# Patient Record
Sex: MALE | Race: WHITE | NOT HISPANIC OR LATINO | Employment: PART TIME | URBAN - METROPOLITAN AREA
[De-identification: names, ages, dates, MRNs, and addresses within clinical notes are randomized per-mention and may not be internally consistent; named-entity substitution may affect disease eponyms.]

---

## 2017-12-19 ENCOUNTER — ALLSCRIPTS OFFICE VISIT (OUTPATIENT)
Dept: OTHER | Facility: OTHER | Age: 37
End: 2017-12-19

## 2018-01-23 VITALS
DIASTOLIC BLOOD PRESSURE: 60 MMHG | HEART RATE: 80 BPM | RESPIRATION RATE: 16 BRPM | BODY MASS INDEX: 34.21 KG/M2 | TEMPERATURE: 98.1 F | OXYGEN SATURATION: 98 % | SYSTOLIC BLOOD PRESSURE: 102 MMHG | WEIGHT: 231 LBS | HEIGHT: 69 IN

## 2018-01-23 NOTE — PROGRESS NOTES
Assessment    1  Encounter for preventive health examination (V70 0) (Z00 00)   2  BMI 34 0-34 9,adult (V85 34) (Z68 34)    Plan  Flu vaccine need    · Fluzone Quadrivalent 0 5 ML Intramuscular Suspension Prefilled Syringe  Need for Tdap vaccination    · Adacel 5-2-15 5 LF-MCG/0 5 Intramuscular Suspension    Discussion/Summary  Impression: health maintenance visit  Currently, he eats an adequate diet and has an inadequate exercise regimen  He was advised to be evaluated by a dentist  Advice and education were given regarding nutrition, sunscreen use, self skin examination and seat belt use  Patient discussion: discussed with the patient  1)CPE: flu vaccine and TDaP Administered  No acute complaints or past medical history, as a result no blood work is warranted at this visit  2)Patient advised to schedule an appointment with dentist  RTC in one year  D/W Dr Lilli Patel       Chief Complaint  CPE 41 yo      History of Present Illness  HPI: 69-year-old male with no significant past medical history presents for his complete physical exam today  He denies any previous hospitalizations or surgeries and admits that he has not been to a doctor for over 15 years  He was prompted to schedule this appointment by his mother who insisted he needed a physical as he had not been assessed for a long time  He recently saw an optometrist one month ago when he was prescribed new glasses but has not been to a dentist for over 15 years also  He has never smoked, used drugs, drink alcohol or been sexually active  His family history is significant only for his mother being hypertensive  Review of Systems    Constitutional: no fever, not feeling poorly, no chills and not feeling tired  Eyes: eyesight problems and wears glasses, but no eye pain and no purulent discharge from the eyes  ENT: no earache, no sore throat, no hearing loss, no nasal discharge and no hoarseness     Cardiovascular: no chest pain, no intermittent leg claudication, the heart rate was not fast, no palpitations and no extremity edema  Respiratory: no shortness of breath, no cough, no wheezing and no shortness of breath during exertion  Gastrointestinal: diarrhea and loose bowel movements 2 days ago, but no abdominal pain, no nausea, no vomiting, no constipation and no blood in stools  Genitourinary: no dysuria and no incontinence  Musculoskeletal: no arthralgias and no myalgias  Integumentary: no rashes and no skin lesions  Neurological: no headache, no numbness, no tingling, no confusion, no dizziness, no limb weakness and no fainting  Psychiatric: no depression  Over the past 2 weeks, how often have you been bothered by the following problems? 1 ) Little interest or pleasure in doing things? Several days  2 ) Feeling down, depressed or hopeless? Not at all    3 ) Trouble falling asleep or sleeping too much? Not at all    4 ) Feeling tired or having little energy? Not at all    5 ) Poor appetite or overeating? Not at all    6 ) Feeling bad about yourself, or that you are a failure, or have let yourself or your family down? Not at all    7 ) Trouble concentrating on things, such as reading a newspaper or watching television? Not at all    8 ) Moving or speaking so slowly that other people could have noticed, or the opposite, moving or speaking faster than usual? Not at all    9 ) Thoughts that you would be better off dead or of hurting yourself in some way? Not at all  Score 1      Social History    · Never a smoker   · Non-smoker (V49 89) (Z78 9)    Current Meds   1  No Reported Medications Recorded    Allergies    1   No Known Drug Allergies    Vitals   Recorded: [id number] 02:30PM   Temperature 98 1 F   Heart Rate 80   Respiration 16   Systolic 894   Diastolic 60   Height 5 ft 9 in   Weight 231 lb    BMI Calculated 34 11   BSA Calculated 2 2   O2 Saturation 98     Physical Exam    Constitutional   General appearance: No acute distress, well appearing and well nourished  Head and Face   Head and face: Normal     Palpation of the face and sinuses: No sinus tenderness  Eyes   Conjunctiva and lids: No erythema, swelling or discharge  Pupils and irises: Equal, round, reactive to light  Ophthalmoscopic examination: Normal fundi and optic discs  Ears, Nose, Mouth, and Throat   External inspection of ears and nose: Normal     Otoscopic examination: Tympanic membranes translucent with normal light reflex  Canals patent without erythema  Nasal mucosa, septum, and turbinates: Normal without edema or erythema  Lips, teeth, and gums: Normal, good dentition  Oropharynx: Normal with no erythema, edema, exudate or lesions  Neck   Neck: Supple, symmetric, trachea midline, no masses  Thyroid: Normal, no thyromegaly  Pulmonary   Respiratory effort: No increased work of breathing or signs of respiratory distress  Auscultation of lungs: Clear to auscultation  Cardiovascular   Auscultation of heart: Normal rate and rhythm, normal S1 and S2, no murmurs  Examination of extremities for edema and/or varicosities: Normal     Abdomen   Abdomen: Non-tender, no masses  Liver and spleen: No hepatomegaly or splenomegaly  Lymphatic   Palpation of lymph nodes in neck: No lymphadenopathy  Musculoskeletal   Gait and station: Normal     Inspection/palpation of digits and nails: Normal without clubbing or cyanosis  Inspection/palpation of joints, bones, and muscles: Normal     Muscle strength/tone: Normal     Skin   Skin and subcutaneous tissue: Normal without rashes or lesions  Palpation of skin and subcutaneous tissue: Normal turgor         Signatures   Electronically signed by : NAUN Car ; Dec 19 2017 10:08PM EST                       (Author)

## 2018-05-15 ENCOUNTER — OFFICE VISIT (OUTPATIENT)
Dept: FAMILY MEDICINE CLINIC | Facility: CLINIC | Age: 38
End: 2018-05-15
Payer: MEDICAID

## 2018-05-15 VITALS
OXYGEN SATURATION: 97 % | SYSTOLIC BLOOD PRESSURE: 106 MMHG | BODY MASS INDEX: 34.11 KG/M2 | WEIGHT: 231 LBS | TEMPERATURE: 98.4 F | RESPIRATION RATE: 16 BRPM | DIASTOLIC BLOOD PRESSURE: 82 MMHG | HEART RATE: 84 BPM

## 2018-05-15 DIAGNOSIS — M79.662 PAIN OF LEFT CALF: Primary | ICD-10-CM

## 2018-05-15 PROCEDURE — 99213 OFFICE O/P EST LOW 20 MIN: CPT | Performed by: FAMILY MEDICINE

## 2018-05-15 NOTE — PROGRESS NOTES
Assessment/Plan:    Pain of left calf  Requisition given to patient for referral for venous Doppler ultrasound of left lower extremity  Advised if symptoms worsen to RTC  Will follow up after results  If ultrasound negative for treat his musculoskeletal in etiology    D/W Dr Jovita Olea    Subjective:      Patient ID: Jethro Ross is a 41 y o  male  HPI  This is a 72-year-old male with no significant past medical history who presents with a one-week history of left calf pain  Patient denies any trauma or injury to the area nor has he started any exercise program or weight lifting  He denies any recent surgery, travel, smoking or family history of hypercoagulable disorders  No fever, chest pain, shortness of breath or palpitations  Review of Systems   Constitutional: Negative for chills and fever  HENT: Negative for congestion, ear pain, rhinorrhea and sore throat  Eyes: Negative for discharge and visual disturbance  Respiratory: Negative for cough, chest tightness, shortness of breath, wheezing and stridor  Cardiovascular: Negative for chest pain, palpitations and leg swelling  Gastrointestinal: Negative for abdominal pain, constipation, diarrhea, nausea and vomiting  Genitourinary: Negative for dysuria, hematuria and urgency  Musculoskeletal: Negative for arthralgias, back pain, gait problem, joint swelling, myalgias, neck pain and neck stiffness  Left calf pain   Skin: Negative for color change, pallor, rash and wound  Neurological: Negative for dizziness, syncope, weakness, light-headedness and numbness  Hematological: Negative for adenopathy  Objective:      /82   Pulse 84   Temp 98 4 °F (36 9 °C)   Resp 16   Wt 105 kg (231 lb)   SpO2 97%   BMI 34 11 kg/m²          Physical Exam   Constitutional: He is oriented to person, place, and time  He appears well-developed and well-nourished  No distress  HENT:   Head: Normocephalic and atraumatic     Nose: Nose normal  Mouth/Throat: Oropharynx is clear and moist  No oropharyngeal exudate  Eyes: Conjunctivae are normal  Right eye exhibits no discharge  Left eye exhibits no discharge  No scleral icterus  Neck: Normal range of motion  Neck supple  No JVD present  Cardiovascular: Normal rate, regular rhythm, normal heart sounds and intact distal pulses  Exam reveals no gallop and no friction rub  No murmur heard  Pulmonary/Chest: Effort normal and breath sounds normal  No stridor  No respiratory distress  He has no wheezes  He has no rales  He exhibits no tenderness  Abdominal: He exhibits no distension and no mass  There is no tenderness  There is no rebound and no guarding  Musculoskeletal: Normal range of motion  He exhibits no edema, tenderness or deformity  No swelling, warmth or erythema of left calf  Mild tenderness on palpation of lower left calf  Kelly's positive   Neurological: He is alert and oriented to person, place, and time  He has normal reflexes  No cranial nerve deficit  Skin: Skin is warm  No rash noted  He is not diaphoretic  No erythema  No pallor  Psychiatric: He has a normal mood and affect

## 2018-05-15 NOTE — ASSESSMENT & PLAN NOTE
Requisition given to patient for referral for venous Doppler ultrasound of left lower extremity  Advised if symptoms worsen to RTC  Will follow up after results   If ultrasound negative for treat his musculoskeletal in etiology

## 2019-03-20 ENCOUNTER — OFFICE VISIT (OUTPATIENT)
Dept: FAMILY MEDICINE CLINIC | Facility: CLINIC | Age: 39
End: 2019-03-20

## 2019-03-20 VITALS
HEART RATE: 78 BPM | OXYGEN SATURATION: 98 % | SYSTOLIC BLOOD PRESSURE: 132 MMHG | RESPIRATION RATE: 18 BRPM | BODY MASS INDEX: 35.29 KG/M2 | WEIGHT: 239 LBS | DIASTOLIC BLOOD PRESSURE: 80 MMHG | TEMPERATURE: 97.6 F

## 2019-03-20 DIAGNOSIS — R53.83 FATIGUE DUE TO SLEEP PATTERN DISTURBANCE: Primary | ICD-10-CM

## 2019-03-20 DIAGNOSIS — Z80.0 FAMILY HISTORY OF LIVER CANCER: ICD-10-CM

## 2019-03-20 DIAGNOSIS — E66.01 CLASS 3 SEVERE OBESITY DUE TO EXCESS CALORIES WITH SERIOUS COMORBIDITY AND BODY MASS INDEX (BMI) OF 40.0 TO 44.9 IN ADULT (HCC): ICD-10-CM

## 2019-03-20 DIAGNOSIS — Z23 ENCOUNTER FOR VACCINATION: ICD-10-CM

## 2019-03-20 DIAGNOSIS — G47.9 FATIGUE DUE TO SLEEP PATTERN DISTURBANCE: Primary | ICD-10-CM

## 2019-03-20 PROCEDURE — 90686 IIV4 VACC NO PRSV 0.5 ML IM: CPT | Performed by: FAMILY MEDICINE

## 2019-03-20 PROCEDURE — 99214 OFFICE O/P EST MOD 30 MIN: CPT | Performed by: FAMILY MEDICINE

## 2019-03-20 PROCEDURE — 90471 IMMUNIZATION ADMIN: CPT | Performed by: FAMILY MEDICINE

## 2019-03-22 PROBLEM — G47.9 FATIGUE DUE TO SLEEP PATTERN DISTURBANCE: Status: ACTIVE | Noted: 2019-03-22

## 2019-03-22 PROBLEM — R53.83 FATIGUE DUE TO SLEEP PATTERN DISTURBANCE: Status: ACTIVE | Noted: 2019-03-22

## 2019-03-22 PROBLEM — Z23 ENCOUNTER FOR VACCINATION: Status: ACTIVE | Noted: 2019-03-22

## 2019-03-22 PROBLEM — E66.01 CLASS 3 SEVERE OBESITY DUE TO EXCESS CALORIES WITH SERIOUS COMORBIDITY AND BODY MASS INDEX (BMI) OF 40.0 TO 44.9 IN ADULT (HCC): Status: ACTIVE | Noted: 2019-03-22

## 2019-03-22 PROBLEM — Z80.0 FAMILY HISTORY OF LIVER CANCER: Status: ACTIVE | Noted: 2019-03-22

## 2019-03-22 NOTE — PROGRESS NOTES
Assessment/Plan:    Class 3 severe obesity due to excess calories with serious comorbidity and body mass index (BMI) of 40 0 to 44 9 in adult [facility])  Lipid panel ordered, conuseled on lifestyle and diet modification  Family history of liver cancer  CBC and CMP ordered       Diagnoses and all orders for this visit:    Fatigue due to sleep pattern disturbance  -     CBC and Platelet; Future    Class 3 severe obesity due to excess calories with serious comorbidity and body mass index (BMI) of 40 0 to 44 9 in adult [facility])  -     Lipid Panel with Direct LDL reflex; Future  -     Basic metabolic panel; Future    Encounter for vaccination  -     SYRINGE/SINGLE-DOSE VIAL: influenza vaccine, [id number], quadrivalent, 0 5 mL, preservative-free (AFLURIA, FLUARIX, FLULAVAL, FLUZONE)    Family history of liver cancer          Subjective:      Patient ID: Yesica Rinaldi is a 46 y o  male  Pt here for CPE, no medical problems  Does not smoke, drink or do recreational drugs  Pt wants BW as mother recently passed away from stage 4 live cancer  Father passed from alcoholic liver cirrhosis  Pt works at [employer] his mother at the moment but not depressed  Eats a lot of snacks and knows that diet can be improved  The following portions of the patient's history were reviewed and updated as appropriate: allergies, current medications, past family history, past medical history, past social history, past surgical history and problem list     Review of Systems   Constitutional: Positive for fatigue  All other systems reviewed and are negative  Objective:      /80 (BP Location: Left arm, Patient Position: Sitting, Cuff Size: Large)   Pulse 78   Temp 97 6 °F (36 4 °C) (Tympanic)   Resp 18   Wt 108 kg (239 lb)   SpO2 98%   BMI 35 29 kg/m²          Physical Exam   Constitutional: He is oriented to person, place, and time  He appears well-developed and well-nourished     HENT:   Head: Normocephalic and atraumatic  Eyes: Pupils are equal, round, and reactive to light  Conjunctivae and EOM are normal  No scleral icterus  Neck: Normal range of motion  Neck supple  No thyromegaly present  Cardiovascular: Normal rate and regular rhythm  Pulmonary/Chest: Effort normal and breath sounds normal    Abdominal: Soft  Bowel sounds are normal    Musculoskeletal: Normal range of motion  Neurological: He is alert and oriented to person, place, and time  Skin: Skin is warm

## 2022-07-27 ENCOUNTER — TELEPHONE (OUTPATIENT)
Dept: FAMILY MEDICINE CLINIC | Facility: CLINIC | Age: 42
End: 2022-07-27

## 2022-07-27 NOTE — TELEPHONE ENCOUNTER
Called patient, patient stated wanted to stay with us as PCP but does not want to make appointment at this time

## 2022-08-16 ENCOUNTER — TELEPHONE (OUTPATIENT)
Dept: FAMILY MEDICINE CLINIC | Facility: CLINIC | Age: 42
End: 2022-08-16

## 2022-08-16 NOTE — TELEPHONE ENCOUNTER
3rd attempt    Patient attribution: patient is unreachable and has not been seen in our office for 3+ years   Please remove Dr Pfeiffer Just as PCP

## 2022-08-17 NOTE — TELEPHONE ENCOUNTER
08/17/22 12:41 PM     If patient has not been seen within last 3 years, 3 phone calls and a certified letter (must be letter listed in workflow) are to be sent  Once that workflow is completed, please resend PCP removal request with date certified letter sent  PCP will be removed 30 days after certified letter sent (if patient doesn't respond/ schedule with office)       Thank you  Eva Waleska

## 2022-08-30 NOTE — TELEPHONE ENCOUNTER
certified letter receipt received     Called patient 3 times, sent certified letter    patient has not been seen in over 3 years    Please remove Maryan Bulla as PCP

## 2022-09-19 NOTE — TELEPHONE ENCOUNTER
09/19/22 10:47 AM     Thank you for your request  Your request has been received, reviewed, and the patient chart updated  The PCP has successfully been removed with a patient attribution note (letter sent 8/16/22)  This message will now be completed      Thank you  Ja Trevizo

## 2024-05-12 ENCOUNTER — APPOINTMENT (EMERGENCY)
Dept: RADIOLOGY | Facility: HOSPITAL | Age: 44
End: 2024-05-12
Payer: COMMERCIAL

## 2024-05-12 ENCOUNTER — HOSPITAL ENCOUNTER (EMERGENCY)
Facility: HOSPITAL | Age: 44
Discharge: HOME | End: 2024-05-12
Attending: EMERGENCY MEDICINE
Payer: COMMERCIAL

## 2024-05-12 VITALS
TEMPERATURE: 98.3 F | OXYGEN SATURATION: 95 % | RESPIRATION RATE: 18 BRPM | HEART RATE: 80 BPM | SYSTOLIC BLOOD PRESSURE: 131 MMHG | DIASTOLIC BLOOD PRESSURE: 86 MMHG

## 2024-05-12 DIAGNOSIS — J03.90 TONSILLITIS: ICD-10-CM

## 2024-05-12 DIAGNOSIS — J02.0 STREP PHARYNGITIS: Primary | ICD-10-CM

## 2024-05-12 LAB
ANION GAP SERPL CALCULATED.3IONS-SCNC: 8 MMOL/L (ref 4–13)
BASOPHILS # BLD AUTO: 0.07 THOUSANDS/ÂΜL (ref 0–0.1)
BASOPHILS NFR BLD AUTO: 1 % (ref 0–1)
BUN SERPL-MCNC: 10 MG/DL (ref 5–25)
CALCIUM SERPL-MCNC: 8.6 MG/DL (ref 8.4–10.2)
CHLORIDE SERPL-SCNC: 104 MMOL/L (ref 96–108)
CO2 SERPL-SCNC: 25 MMOL/L (ref 21–32)
CREAT SERPL-MCNC: 0.78 MG/DL (ref 0.6–1.3)
EOSINOPHIL # BLD AUTO: 0.32 THOUSAND/ÂΜL (ref 0–0.61)
EOSINOPHIL NFR BLD AUTO: 3 % (ref 0–6)
ERYTHROCYTE [DISTWIDTH] IN BLOOD BY AUTOMATED COUNT: 13 % (ref 11.6–15.1)
GFR SERPL CREATININE-BSD FRML MDRD: 110 ML/MIN/1.73SQ M
GLUCOSE SERPL-MCNC: 143 MG/DL (ref 65–140)
HCT VFR BLD AUTO: 46.1 % (ref 36.5–49.3)
HGB BLD-MCNC: 15.5 G/DL (ref 12–17)
IMM GRANULOCYTES # BLD AUTO: 0.11 THOUSAND/UL (ref 0–0.2)
IMM GRANULOCYTES NFR BLD AUTO: 1 % (ref 0–2)
LYMPHOCYTES # BLD AUTO: 2.44 THOUSANDS/ÂΜL (ref 0.6–4.47)
LYMPHOCYTES NFR BLD AUTO: 21 % (ref 14–44)
MCH RBC QN AUTO: 29 PG (ref 26.8–34.3)
MCHC RBC AUTO-ENTMCNC: 33.6 G/DL (ref 31.4–37.4)
MCV RBC AUTO: 86 FL (ref 82–98)
MONOCYTES # BLD AUTO: 0.67 THOUSAND/ÂΜL (ref 0.17–1.22)
MONOCYTES NFR BLD AUTO: 6 % (ref 4–12)
NEUTROPHILS # BLD AUTO: 8.2 THOUSANDS/ÂΜL (ref 1.85–7.62)
NEUTS SEG NFR BLD AUTO: 68 % (ref 43–75)
NRBC BLD AUTO-RTO: 0 /100 WBCS
PLATELET # BLD AUTO: 435 THOUSANDS/UL (ref 149–390)
PMV BLD AUTO: 9 FL (ref 8.9–12.7)
POTASSIUM SERPL-SCNC: 3.6 MMOL/L (ref 3.5–5.3)
RBC # BLD AUTO: 5.35 MILLION/UL (ref 3.88–5.62)
S PYO DNA THROAT QL NAA+PROBE: DETECTED
SODIUM SERPL-SCNC: 137 MMOL/L (ref 135–147)
WBC # BLD AUTO: 11.81 THOUSAND/UL (ref 4.31–10.16)

## 2024-05-12 PROCEDURE — 99285 EMERGENCY DEPT VISIT HI MDM: CPT | Performed by: EMERGENCY MEDICINE

## 2024-05-12 PROCEDURE — 87651 STREP A DNA AMP PROBE: CPT | Performed by: EMERGENCY MEDICINE

## 2024-05-12 PROCEDURE — 80048 BASIC METABOLIC PNL TOTAL CA: CPT | Performed by: EMERGENCY MEDICINE

## 2024-05-12 PROCEDURE — 96365 THER/PROPH/DIAG IV INF INIT: CPT

## 2024-05-12 PROCEDURE — 86308 HETEROPHILE ANTIBODY SCREEN: CPT | Performed by: EMERGENCY MEDICINE

## 2024-05-12 PROCEDURE — 96375 TX/PRO/DX INJ NEW DRUG ADDON: CPT

## 2024-05-12 PROCEDURE — 99284 EMERGENCY DEPT VISIT MOD MDM: CPT

## 2024-05-12 PROCEDURE — 70491 CT SOFT TISSUE NECK W/DYE: CPT

## 2024-05-12 PROCEDURE — 96367 TX/PROPH/DG ADDL SEQ IV INF: CPT

## 2024-05-12 PROCEDURE — 85025 COMPLETE CBC W/AUTO DIFF WBC: CPT | Performed by: EMERGENCY MEDICINE

## 2024-05-12 PROCEDURE — 36415 COLL VENOUS BLD VENIPUNCTURE: CPT | Performed by: EMERGENCY MEDICINE

## 2024-05-12 RX ORDER — DEXAMETHASONE 4 MG/1
4 TABLET ORAL 2 TIMES DAILY WITH MEALS
Qty: 8 TABLET | Refills: 0 | Status: SHIPPED | OUTPATIENT
Start: 2024-05-12

## 2024-05-12 RX ORDER — KETOROLAC TROMETHAMINE 30 MG/ML
15 INJECTION, SOLUTION INTRAMUSCULAR; INTRAVENOUS ONCE
Status: COMPLETED | OUTPATIENT
Start: 2024-05-12 | End: 2024-05-12

## 2024-05-12 RX ORDER — FAMOTIDINE 20 MG/1
20 TABLET, FILM COATED ORAL 2 TIMES DAILY
Qty: 30 TABLET | Refills: 0 | Status: SHIPPED | OUTPATIENT
Start: 2024-05-12

## 2024-05-12 RX ORDER — NAPROXEN 500 MG/1
500 TABLET ORAL 2 TIMES DAILY WITH MEALS
Qty: 30 TABLET | Refills: 0 | Status: SHIPPED | OUTPATIENT
Start: 2024-05-12

## 2024-05-12 RX ORDER — AMOXICILLIN AND CLAVULANATE POTASSIUM 875; 125 MG/1; MG/1
1 TABLET, FILM COATED ORAL EVERY 12 HOURS
Qty: 18 TABLET | Refills: 0 | Status: SHIPPED | OUTPATIENT
Start: 2024-05-12 | End: 2024-05-21

## 2024-05-12 RX ORDER — DEXAMETHASONE SODIUM PHOSPHATE 10 MG/ML
10 INJECTION, SOLUTION INTRAMUSCULAR; INTRAVENOUS ONCE
Status: COMPLETED | OUTPATIENT
Start: 2024-05-12 | End: 2024-05-12

## 2024-05-12 RX ORDER — AMOXICILLIN AND CLAVULANATE POTASSIUM 875; 125 MG/1; MG/1
1 TABLET, FILM COATED ORAL EVERY 12 HOURS
Qty: 14 TABLET | Refills: 0 | Status: SHIPPED | OUTPATIENT
Start: 2024-05-12 | End: 2024-05-19

## 2024-05-12 RX ORDER — ACETAMINOPHEN 10 MG/ML
1000 INJECTION, SOLUTION INTRAVENOUS ONCE
Status: COMPLETED | OUTPATIENT
Start: 2024-05-12 | End: 2024-05-12

## 2024-05-12 RX ADMIN — DEXAMETHASONE SODIUM PHOSPHATE 10 MG: 10 INJECTION, SOLUTION INTRAMUSCULAR; INTRAVENOUS at 05:38

## 2024-05-12 RX ADMIN — IOHEXOL 100 ML: 350 INJECTION, SOLUTION INTRAVENOUS at 06:39

## 2024-05-12 RX ADMIN — SODIUM CHLORIDE 3 G: 9 INJECTION, SOLUTION INTRAVENOUS at 06:12

## 2024-05-12 RX ADMIN — KETOROLAC TROMETHAMINE 15 MG: 30 INJECTION, SOLUTION INTRAMUSCULAR; INTRAVENOUS at 06:48

## 2024-05-12 RX ADMIN — ACETAMINOPHEN 1000 MG: 10 INJECTION INTRAVENOUS at 06:48

## 2024-05-12 NOTE — ED PROVIDER NOTES
Final Diagnosis:  1. Strep pharyngitis    2. Tonsillitis        Chief Complaint   Patient presents with    Sore Throat     Pt states has had a sore throat for 1 week but coughed very hard and felt like 'his tonsils were going to come out of his throat' pt endorses painful swallowing and voice changes. States difficulty sleeping due to throat discomfort.        HPI  Patient comes in w/ sore throat, vocal fullness/hot potato, and difficulty sleeping beecause when head lies back chokes. 1 week. Worsened today after he had a coughing fit. Acutely. No fevers at home but chills. Tolerating secretions. No tripodding but can't lie back easily. Still has ROM of neck.     EMS additionally reports:     - Previous charting underwent limited review with attention to last ED visits, labs, ekgs, and prior imaging.  Chart review reveals :     No results found for any previous visit.       - No language barrier.   - History obtained from patient    - Discuss patient's care, with patient permission or by chart review, with      PMH:   has no past medical history on file.    PSH:   has no past surgical history on file.     Social History:  Tobacco Use: Unknown (5/12/2024)    Patient History     Smoking Tobacco Use: Never     Smokeless Tobacco Use: Unknown     Passive Exposure: Not on file     Alcohol Use: Not on file     No illicit use       ROS:  Pertinent positives/negatives: .     Some ROS may be present in the HPI and would take precedent over these standard questions asked below.   Review of Systems   Constitutional:  Positive for chills.   HENT:  Positive for sore throat and voice change. Negative for trouble swallowing.    Respiratory:  Positive for cough.         CONSTITUTIONAL:  No lethargy. No unexpected weight loss. No change in behavior.  EYES:  No pain, redness, or discharge. No loss of vision. No orbital trauma or pain.   ENT:  No tinnitus or decreased hearing. No epistaxis/purulent rhinorrhea. No voice change, airway  closing, trismus.   CARDIOVASCULAR:  No chest pain. No skin mottling or pallor. No change in exertional capacity  RESPIRATORY:  No hemoptysis. No paroxysmal nocturnal dyspnea. No stridor. No apnea or bluing.   GASTROINTESTINAL:  No vomiting, diarrhea. No distension. No melena. No hematochezia.   GENITOURINARY:  No nocturia. No hematuria or foul smelling or cloudy urine. No discharge. No sores/adenopathy.   MUSCULOSKELETAL:  No contracture.  No new deformity.   INTEGUMENTARY:  No swelling. No unexpected contusions. No abrasions. No lymphangitis.  NEUROLOGIC:  No meningismus. No new numbness of the extremities. No new focal weakness. No postural instability  PSYCHIATRIC:  No SI HI AVH  HEMATOLOGICAL:  No bleeding. No petechiae. No bruising.  ALLERGIES:  No urticaria. No sudden abd cramping. No stridor.    PE:     Physical exam highlights:   Physical Exam       Vitals:    05/12/24 0545 05/12/24 0645 05/12/24 0700 05/12/24 0807   BP: 121/81 141/90  131/86   BP Location: Right arm Right arm     Pulse: 90 86 80 80   Resp: 18 18  18   Temp:       TempSrc:       SpO2: 94% 97% 94% 95%     Vitals reviewed by me.   Nursing note reviewed  Chaperone present for all sensitive exam.  Const: No acute distress. Alert. Nontoxic. Not diaphoretic.    HEENT: External ears normal. No protrusion drainage swelling. Nose normal. No drainage/traumatic deformity. MM. Mouth with baseline/symmetric movement. No trismus.   Eyes: No squinting. No icterus. No tearing/swelling/drainage. Tracks through the room with normal EOM.   Neck: ROM normal. No rigidity. No meningismus.  Cards: Rate as per vitals Compared to monitor sinus unless documented. Regular Well perfused.  Pulm: Effort and excursion normal. No distress. No audible wheezing/no stridor. Normal resp rate without retraction or change in work of breathing.  Abd: No distension beyond baseline. No fluctuant wave. Patient without peritoneal pain with shifting/bumping the bed.   MSK: ROM normal  baseline. No deformity. No contractures from baseline.   Skin: No new rashes visible. Well perfused. No wounds visualized on exposed skin  Neuro: Nonfocal. Baseline. CN grossly intact. Moving all four with coordination.   Psych: Normal behavior and affect.        A:  - Nursing note reviewed.    Ddx and MDM  Considered diagnoses    Happened after acute event.   Vasc injury? Unlikely but on ddx    Progressive swelling  Peritonsillar abscess? Retropharyngeal?    No tongue lift  Tolerating sec  Unasyn  Decadron    F/u CT signed out to day team         Dispo decision       My conversation with consultant reveals:        Decision rules:                           My read of the XR/CT scan reveals:     CT soft tissue neck with contrast   Final Result      Heterogeneous enlargement and striated enhancement of the tonsils is consistent with tonsillitis. No discrete tonsillar or peritonsillar collections identified.            Workstation performed: [id number]             Orders Placed This Encounter   Procedures    Strep A PCR    CT soft tissue neck with contrast    CBC and differential    Mononucleosis screen    Basic metabolic panel     Labs Reviewed   STREP A PCR - Abnormal       Result Value Ref Range Status    STREP A PCR Detected (*) Not Detected Final   CBC AND DIFFERENTIAL - Abnormal    WBC 11.81 (*) 4.31 - 10.16 Thousand/uL Final    RBC 5.35  3.88 - 5.62 Million/uL Final    Hemoglobin 15.5  12.0 - 17.0 g/dL Final    Hematocrit 46.1  36.5 - 49.3 % Final    MCV 86  82 - 98 fL Final    MCH 29.0  26.8 - 34.3 pg Final    MCHC 33.6  31.4 - 37.4 g/dL Final    RDW 13.0  11.6 - 15.1 % Final    MPV 9.0  8.9 - 12.7 fL Final    Platelets 435 (*) 149 - 390 Thousands/uL Final    nRBC 0  /100 WBCs Final    Segmented % 68  43 - 75 % Final    Immature Grans % 1  0 - 2 % Final    Lymphocytes % 21  14 - 44 % Final    Monocytes % 6  4 - 12 % Final    Eosinophils Relative 3  0 - 6 % Final    Basophils Relative 1  0 - 1 % Final     Absolute Neutrophils 8.20 (*) 1.85 - 7.62 Thousands/µL Final    Absolute Immature Grans 0.11  0.00 - 0.20 Thousand/uL Final    Absolute Lymphocytes 2.44  0.60 - 4.47 Thousands/µL Final    Absolute Monocytes 0.67  0.17 - 1.22 Thousand/µL Final    Eosinophils Absolute 0.32  0.00 - 0.61 Thousand/µL Final    Basophils Absolute 0.07  0.00 - 0.10 Thousands/µL Final   BASIC METABOLIC PANEL - Abnormal    Sodium 137  135 - 147 mmol/L Final    Potassium 3.6  3.5 - 5.3 mmol/L Final    Chloride 104  96 - 108 mmol/L Final    CO2 25  21 - 32 mmol/L Final    ANION GAP 8  4 - 13 mmol/L Final    BUN 10  5 - 25 mg/dL Final    Creatinine 0.78  0.60 - 1.30 mg/dL Final    Comment: Standardized to IDMS reference method    Glucose 143 (*) 65 - 140 mg/dL Final    Comment: If the patient is fasting, the ADA then defines impaired fasting glucose as > 100 mg/dL and diabetes as > or equal to 123 mg/dL.    Calcium 8.6  8.4 - 10.2 mg/dL Final    eGFR 110  ml/min/1.73sq m Final    Narrative:     National Kidney Disease Foundation guidelines for Chronic Kidney Disease (CKD):     Stage 1 with normal or high GFR (GFR > 90 mL/min/1.73 square meters)    Stage 2 Mild CKD (GFR = 60-89 mL/min/1.73 square meters)    Stage 3A Moderate CKD (GFR = 45-59 mL/min/1.73 square meters)    Stage 3B Moderate CKD (GFR = 30-44 mL/min/1.73 square meters)    Stage 4 Severe CKD (GFR = 15-29 mL/min/1.73 square meters)    Stage 5 End Stage CKD (GFR <15 mL/min/1.73 square meters)  Note: GFR calculation is accurate only with a steady state creatinine   MONONUCLEOSIS SCREEN       *Each of these labs was reviewed. Particular standout labs will be noted in the ED Course above     Final Diagnosis:  1. Strep pharyngitis    2. Tonsillitis          P:  - hospital tx includes   Medications   dexamethasone (PF) (DECADRON) injection 10 mg (10 mg Intravenous Given 5/12/24 0538)   ampicillin-sulbactam (UNASYN) 3 g in sodium chloride 0.9 % 100 mL IVPB (0 g Intravenous Stopped 5/12/24  0645)   iohexol (OMNIPAQUE) 350 MG/ML injection (MULTI-DOSE) 100 mL (100 mL Intravenous Given 5/12/24 0639)   ketorolac (TORADOL) injection 15 mg (15 mg Intravenous Given 5/12/24 0648)   acetaminophen (Ofirmev) injection 1,000 mg (0 mg Intravenous Stopped 5/12/24 0738)         - disposition  Time reflects when diagnosis was documented in both MDM as applicable and the Disposition within this note       Time User Action Codes Description Comment    5/12/2024  7:40 AM Emery Henriquez Add [J02.0] Strep pharyngitis     5/12/2024  7:59 AM Ramiro Terrell Add [J03.90] Tonsillitis           ED Disposition       ED Disposition   Discharge    Condition   Stable    Date/Time   Sun May 12, 2024  7:59 AM    Comment   Igor Bowden discharge to home/self care.                   Follow-up Information       Follow up With Specialties Details Why Contact Info Additional Information    [facility]   [address] [place] [id number]  [phone] [facility] [place], [address] [place] [address], [address], [phone], [phone]            - patient will call their PCP to let them know they were in the emergency department. We discuss return precautions and patient is agreeable with plan and aformentioned disposition.       - additional treatment intended, if consistent with primary provider:  - patient to follow with :      Discharge Medication List as of 5/12/2024  8:00 AM        START taking these medications    Details   !! amoxicillin-clavulanate (AUGMENTIN) 875-125 mg per tablet Take 1 tablet by mouth every 12 (twelve) hours for 7 days, Starting Sun 5/12/2024, Until Sun 5/19/2024, Normal      !! amoxicillin-clavulanate (AUGMENTIN) 875-125 mg per tablet Take 1 tablet by mouth every 12 (twelve) hours for 9 days, Starting Sun 5/12/2024, Until Tue 5/21/2024, Normal      dexamethasone (DECADRON) 4 mg tablet Take 1 tablet (4 mg total) by mouth 2 (two) times a  "day with meals, Starting Sun 5/12/2024, Normal      famotidine (PEPCID) 20 mg tablet Take 1 tablet (20 mg total) by mouth 2 (two) times a day, Starting Sun 5/12/2024, Normal      naproxen (Naprosyn) 500 mg tablet Take 1 tablet (500 mg total) by mouth 2 (two) times a day with meals, Starting Sun 5/12/2024, Normal       !! - Potential duplicate medications found. Please discuss with provider.        No discharge procedures on file.  None       Portions of the record may have been created with voice recognition software. Occasional wrong word or \"sound a like\" substitutions may have occurred due to the inherent limitations of voice recognition software. Read the chart carefully and recognize, using context, where substitutions have occurred.    Electronically signed by:  MD Emery Catherine MD  05/12/24 4614    "

## 2024-05-13 LAB — HETEROPH AB SER QL: NEGATIVE
